# Patient Record
Sex: FEMALE | Race: WHITE | NOT HISPANIC OR LATINO | Employment: FULL TIME | URBAN - METROPOLITAN AREA
[De-identification: names, ages, dates, MRNs, and addresses within clinical notes are randomized per-mention and may not be internally consistent; named-entity substitution may affect disease eponyms.]

---

## 2017-03-21 ENCOUNTER — LAB VISIT (OUTPATIENT)
Dept: LAB | Facility: OTHER | Age: 27
End: 2017-03-21
Attending: OBSTETRICS & GYNECOLOGY
Payer: COMMERCIAL

## 2017-03-21 ENCOUNTER — OFFICE VISIT (OUTPATIENT)
Dept: OBSTETRICS AND GYNECOLOGY | Facility: CLINIC | Age: 27
End: 2017-03-21
Attending: OBSTETRICS & GYNECOLOGY
Payer: COMMERCIAL

## 2017-03-21 VITALS
WEIGHT: 197.75 LBS | DIASTOLIC BLOOD PRESSURE: 82 MMHG | SYSTOLIC BLOOD PRESSURE: 118 MMHG | HEIGHT: 65 IN | BODY MASS INDEX: 32.95 KG/M2

## 2017-03-21 DIAGNOSIS — N91.1 AMENORRHEA, SECONDARY: ICD-10-CM

## 2017-03-21 DIAGNOSIS — Z01.419 WELL WOMAN EXAM WITH ROUTINE GYNECOLOGICAL EXAM: Primary | ICD-10-CM

## 2017-03-21 DIAGNOSIS — E28.2 PCOS (POLYCYSTIC OVARIAN SYNDROME): ICD-10-CM

## 2017-03-21 DIAGNOSIS — B00.9 HSV-2 INFECTION: ICD-10-CM

## 2017-03-21 LAB
T4 FREE SERPL-MCNC: 0.78 NG/DL
TSH SERPL DL<=0.005 MIU/L-ACNC: 1.51 UIU/ML

## 2017-03-21 PROCEDURE — 84439 ASSAY OF FREE THYROXINE: CPT

## 2017-03-21 PROCEDURE — 83036 HEMOGLOBIN GLYCOSYLATED A1C: CPT

## 2017-03-21 PROCEDURE — 99999 PR PBB SHADOW E&M-EST. PATIENT-LVL II: CPT | Mod: PBBFAC,,, | Performed by: OBSTETRICS & GYNECOLOGY

## 2017-03-21 PROCEDURE — 36415 COLL VENOUS BLD VENIPUNCTURE: CPT

## 2017-03-21 PROCEDURE — 84443 ASSAY THYROID STIM HORMONE: CPT

## 2017-03-21 PROCEDURE — 99385 PREV VISIT NEW AGE 18-39: CPT | Mod: S$GLB,,, | Performed by: OBSTETRICS & GYNECOLOGY

## 2017-03-21 RX ORDER — VALACYCLOVIR HYDROCHLORIDE 500 MG/1
TABLET, FILM COATED ORAL
Refills: 5 | COMMUNITY
Start: 2017-03-11 | End: 2017-03-21 | Stop reason: SDUPTHER

## 2017-03-21 RX ORDER — VALACYCLOVIR HYDROCHLORIDE 500 MG/1
500 TABLET, FILM COATED ORAL DAILY
Qty: 90 TABLET | Refills: 5 | Status: SHIPPED | OUTPATIENT
Start: 2017-03-21 | End: 2022-02-24

## 2017-03-21 RX ORDER — MEDROXYPROGESTERONE ACETATE 10 MG/1
10 TABLET ORAL DAILY
Qty: 10 TABLET | Refills: 0 | Status: SHIPPED | OUTPATIENT
Start: 2017-03-21 | End: 2022-04-27

## 2017-03-21 RX ORDER — LEVONORGESTREL / ETHINYL ESTRADIOL AND ETHINYL ESTRADIOL 150-30(84)
1 KIT ORAL DAILY
Qty: 90 EACH | Refills: 4 | Status: SHIPPED | OUTPATIENT
Start: 2017-03-21 | End: 2022-04-27

## 2017-03-21 NOTE — PROGRESS NOTES
CC: Well woman exam    Hermila Busch is a 26 y.o. female  presents for well woman exam.  LMP: No LMP recorded. Patient is not currently having periods (Reason: Birth Control)..  Patient presents as a transfer of care due to insurance reasons.  Last pap was in 2016, which was normal, prior to that, she had an abnormal pap in , then q month paps after that which were all normal.  Patient has been diagnosed with PCOS in the past and would like to readdress this, states that she had been on OCPs (seasonique) until  and had normal cycles with this, states that she does not have normal cycles unless she is on birth control.  Since stopping OCPs, she has noted increased facial hair growth, also reports weight gain, states that she has had less than ideal diet and exercise habits.  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  Denies family history of breast/ovarian or colon ca in first degree or multiple relatives.  Works as a nurse in the ICU at [employer]    Past Medical History:   Diagnosis Date    Abnormal Pap smear     HPV    ADHD (attention deficit hyperactivity disorder)     Herpes simplex without mention of complication      Past Surgical History:   Procedure Laterality Date    cyst removal right eye        Social History     Social History    Marital status: Single     Spouse name: N/A    Number of children: N/A    Years of education: N/A     Occupational History    Not on file.     Social History Main Topics    Smoking status: Former Smoker     Packs/day: 0.50     Years: 4.00     Types: Cigarettes     Quit date: 2007    Smokeless tobacco: Never Used    Alcohol use Yes      Comment: social    Drug use: No    Sexual activity: Yes     Partners: Male     Birth control/ protection: OCP     Other Topics Concern    Not on file     Social History Narrative     Family  "History   Problem Relation Age of Onset    Hypertension Father     Amblyopia Father     Diabetes Other     ADD / ADHD Mother     No Known Problems Paternal Grandfather     No Known Problems Paternal Grandmother     No Known Problems Maternal Grandmother     No Known Problems Maternal Grandfather     Bipolar disorder Maternal Aunt     No Known Problems Sister     No Known Problems Brother     No Known Problems Maternal Uncle     No Known Problems Paternal Aunt     No Known Problems Paternal Uncle     Breast cancer Neg Hx     Colon cancer Neg Hx     Ovarian cancer Neg Hx     Suicide Neg Hx     Blindness Neg Hx     Cancer Neg Hx     Cataracts Neg Hx     Glaucoma Neg Hx     Macular degeneration Neg Hx     Retinal detachment Neg Hx     Strabismus Neg Hx     Stroke Neg Hx     Thyroid disease Neg Hx      OB History      Para Term  AB TAB SAB Ectopic Multiple Living    0                   /82  Ht 5' 5" (1.651 m)  Wt 89.7 kg (197 lb 12 oz)  BMI 32.91 kg/m2      ROS:  GENERAL: Denies weight gain or weight loss. Feeling well overall.   SKIN: Denies rash or lesions.   HEAD: Denies head injury or headache.   NODES: Denies enlarged lymph nodes.   CHEST: Denies chest pain or shortness of breath.   CARDIOVASCULAR: Denies palpitations or left sided chest pain.   ABDOMEN: No abdominal pain, constipation, diarrhea, nausea, vomiting or rectal bleeding.   URINARY: No frequency, dysuria, hematuria, or burning on urination.  REPRODUCTIVE: See HPI.   BREASTS: The patient performs breast self-examination and denies pain, lumps, or nipple discharge.   HEMATOLOGIC: No easy bruisability or excessive bleeding.   MUSCULOSKELETAL: Denies joint pain or swelling.   NEUROLOGIC: Denies syncope or weakness.   PSYCHIATRIC: Denies depression, anxiety or mood swings.    PHYSICAL EXAM:  APPEARANCE: Well nourished, well developed, in no acute distress.  AFFECT: WNL, alert and oriented x 3  SKIN: No acne.  " Hirsutism present on chin, no chest or abdominal hair noted.  NECK: Neck symmetric without masses or thyromegaly  NODES: No inguinal, cervical, axillary, or femoral lymph node enlargement  CHEST: Good respiratory effect  ABDOMEN: Soft.  No tenderness or masses.  No hepatosplenomegaly.  No hernias.  BREASTS: Symmetrical, no skin changes or visible lesions.  No palpable masses, nipple discharge bilaterally.  PELVIC: Normal external genitalia without lesions.  Normal hair distribution.  Adequate perineal body, normal urethral meatus.  Vagina moist and well rugated without lesions or discharge.  Cervix pink, without lesions, discharge or tenderness.  No significant cystocele or rectocele.  Bimanual exam shows uterus to be normal size, regular, mobile and nontender.  Adnexa without masses or tenderness.    EXTREMITIES: No edema.    Well woman exam with routine gynecological exam    PCOS (polycystic ovarian syndrome)  -     Hemoglobin A1c; Future; Expected date: 3/21/17  - Discussed diet exercise and weight loss, suggest attempt at losing 10% of current body weight  - Offered spironolactone, declines at this time.  - Meets rotterdam criteria for diagnosis, will r/o thyroid as contributor.    Amenorrhea, secondary  -     TSH; Future; Expected date: 3/21/17  -     T4, free; Future; Expected date: 3/21/17  -     L norgest/e.estradiol-e.estrad (AMETHIA) 0.15 mg-30 mcg (84)/10 mcg (7) 3MPk; Take 1 tablet by mouth once daily.  Dispense: 90 each; Refill: 4  -     medroxyPROGESTERone (PROVERA) 10 MG tablet; Take 1 tablet (10 mg total) by mouth once daily.  Dispense: 10 tablet; Refill: 0    HSV-2 infection  -     valacyclovir (VALTREX) 500 MG tablet; Take 1 tablet (500 mg total) by mouth once daily.  Dispense: 90 tablet; Refill: 5    History abnormal pap        - Repeat in 6/2017, then return to normal screening if results normal at that time        Patient was counseled today on A.C.S. Pap guidelines and recommendations for  yearly pelvic exams, mammograms and monthly self breast exams; to see her PCP for other health maintenance.     No Follow-up on file.

## 2017-03-21 NOTE — MR AVS SNAPSHOT
"    [facility] [address]  [address]  [place] [address]  Phone: [phone]  Fax: [phone]                  Hermila Busch   3/21/2017 11:00 AM   Office Visit    Description:  Female : 1990   Provider:  Sarah Vernon DO   Department:  [facility] [address]           Reason for Visit     Amenorrhea                To Do List           Goals (5 Years of Data)     None      [facility]     [facility] On [facility] Nurse [facility] Line -  Assistance  Registered nurses in the [facility] On Call Center provide clinical advisement, health education, appointment booking, and other advisory services.  Call for this free service at [phone].             Medications           Message regarding Medications     Verify the changes and/or additions to your medication regime listed below are the same as discussed with your clinician today.  If any of these changes or additions are incorrect, please notify your healthcare provider.             Verify that the below list of medications is an accurate representation of the medications you are currently taking.  If none reported, the list may be blank. If incorrect, please contact your healthcare provider. Carry this list with you in case of emergency.           Current Medications     AMETHIA 0.15 mg-30 mcg (84)/10 mcg (7) 3MPk     valacyclovir (VALTREX) 500 MG tablet TAKE 1 TABLET BY MOUTH ONCE A DAY AS DIRECTED, THEN INCREASE TO TWICE A DAY FOR 3 DAYS IF BREAK OUT    dextroamphetamine-amphetamine (AMPHETAMINE SALT COMBO) 20 mg tablet Take 1 tablet by mouth 2 (two) times daily. Not to be taken later than 3pm.           Clinical Reference Information           Your Vitals Were     BP Height Weight BMI       118/82 5' 5" (1.651 m) 89.7 kg (197 lb 12 oz) 32.91 kg/m2       Blood Pressure          Most Recent Value    BP  118/82      Allergies as of 3/21/2017     Amoxil [Amoxicillin]    Sulfacetamide Sodium    Doxycycline    Sulfa (Sulfonamide " Antibiotics)      Immunizations Administered on Date of Encounter - 3/21/2017     None      Language Assistance Services     ATTENTION: Language assistance services are available, free of charge. Please call [phone].      ATENCIÓN: Si habhans callahan, tiene a saul disposición servicios gratuitos de asistencia lingüística. Llame al [phone].     CHÚ Ý: N?u b?n nói Ti?ng Vi?t, có các d?ch v? h? tr? ngôn ng? mi?n phí dành cho b?n. G?i s? [phone].         [facility] [address] complies with applicable Federal civil rights laws and does not discriminate on the basis of race, color, national origin, age, disability, or sex.

## 2017-03-22 ENCOUNTER — TELEPHONE (OUTPATIENT)
Dept: OBSTETRICS AND GYNECOLOGY | Facility: CLINIC | Age: 27
End: 2017-03-22

## 2017-03-22 LAB
ESTIMATED AVG GLUCOSE: 97 MG/DL
HBA1C MFR BLD HPLC: 5 %

## 2021-09-01 NOTE — TELEPHONE ENCOUNTER
Called patient to inform of normal lab findings.   What Type Of Note Output Would You Prefer (Optional)?: Standard Output Hpi Title: Evaluation of Skin Lesions How Severe Are Your Spot(S)?: mild Have Your Spot(S) Been Treated In The Past?: has not been treated

## 2025-06-18 ENCOUNTER — HOSPITAL ENCOUNTER (EMERGENCY)
Facility: HOSPITAL | Age: 35
Discharge: HOME | End: 2025-06-18
Attending: EMERGENCY MEDICINE
Payer: COMMERCIAL

## 2025-06-18 VITALS
OXYGEN SATURATION: 99 % | TEMPERATURE: 98 F | HEART RATE: 61 BPM | SYSTOLIC BLOOD PRESSURE: 108 MMHG | DIASTOLIC BLOOD PRESSURE: 78 MMHG | HEIGHT: 65 IN | RESPIRATION RATE: 12 BRPM | BODY MASS INDEX: 29.68 KG/M2 | WEIGHT: 178.13 LBS

## 2025-06-18 DIAGNOSIS — R07.9 CHEST PAIN: ICD-10-CM

## 2025-06-18 LAB
ABSOLUTE EOSINOPHIL (OHS): 0.16 K/UL
ABSOLUTE MONOCYTE (OHS): 0.67 K/UL (ref 0.3–1)
ABSOLUTE NEUTROPHIL COUNT (OHS): 5.9 K/UL (ref 1.8–7.7)
ALBUMIN SERPL BCP-MCNC: 4.2 G/DL (ref 3.5–5.2)
ALP SERPL-CCNC: 38 UNIT/L (ref 40–150)
ALT SERPL W/O P-5'-P-CCNC: 17 UNIT/L (ref 10–44)
ANION GAP (OHS): 13 MMOL/L (ref 8–16)
AST SERPL-CCNC: 14 UNIT/L (ref 11–45)
B-HCG UR QL: NEGATIVE
BASOPHILS # BLD AUTO: 0.01 K/UL
BASOPHILS NFR BLD AUTO: 0.1 %
BILIRUB SERPL-MCNC: 0.5 MG/DL (ref 0.1–1)
BUN SERPL-MCNC: 12 MG/DL (ref 6–20)
CALCIUM SERPL-MCNC: 9 MG/DL (ref 8.7–10.5)
CHLORIDE SERPL-SCNC: 103 MMOL/L (ref 95–110)
CO2 SERPL-SCNC: 24 MMOL/L (ref 23–29)
CREAT SERPL-MCNC: 0.7 MG/DL (ref 0.5–1.4)
ERYTHROCYTE [DISTWIDTH] IN BLOOD BY AUTOMATED COUNT: 12.3 % (ref 11.5–14.5)
GFR SERPLBLD CREATININE-BSD FMLA CKD-EPI: >60 ML/MIN/1.73/M2
GLUCOSE SERPL-MCNC: 100 MG/DL (ref 70–110)
HCT VFR BLD AUTO: 43 % (ref 37–48.5)
HCV AB SERPL QL IA: NEGATIVE
HGB BLD-MCNC: 14.5 GM/DL (ref 12–16)
HIV 1+2 AB+HIV1 P24 AG SERPL QL IA: NEGATIVE
HOLD SPECIMEN: NORMAL
IMM GRANULOCYTES # BLD AUTO: 0.02 K/UL (ref 0–0.04)
IMM GRANULOCYTES NFR BLD AUTO: 0.2 % (ref 0–0.5)
LYMPHOCYTES # BLD AUTO: 1.94 K/UL (ref 1–4.8)
MCH RBC QN AUTO: 30.2 PG (ref 27–31)
MCHC RBC AUTO-ENTMCNC: 33.7 G/DL (ref 32–36)
MCV RBC AUTO: 90 FL (ref 82–98)
NUCLEATED RBC (/100WBC) (OHS): 0 /100 WBC
OHS QRS DURATION: 82 MS
OHS QTC CALCULATION: 431 MS
PLATELET # BLD AUTO: 204 K/UL (ref 150–450)
PMV BLD AUTO: 10.4 FL (ref 9.2–12.9)
POTASSIUM SERPL-SCNC: 3.6 MMOL/L (ref 3.5–5.1)
PROT SERPL-MCNC: 7.4 GM/DL (ref 6–8.4)
RBC # BLD AUTO: 4.8 M/UL (ref 4–5.4)
RELATIVE EOSINOPHIL (OHS): 1.8 %
RELATIVE LYMPHOCYTE (OHS): 22.3 % (ref 18–48)
RELATIVE MONOCYTE (OHS): 7.7 % (ref 4–15)
RELATIVE NEUTROPHIL (OHS): 67.9 % (ref 38–73)
SODIUM SERPL-SCNC: 140 MMOL/L (ref 136–145)
TROPONIN I SERPL DL<=0.01 NG/ML-MCNC: <0.006 NG/ML
TROPONIN I SERPL DL<=0.01 NG/ML-MCNC: <0.006 NG/ML
WBC # BLD AUTO: 8.7 K/UL (ref 3.9–12.7)

## 2025-06-18 PROCEDURE — 85025 COMPLETE CBC W/AUTO DIFF WBC: CPT | Mod: ER | Performed by: EMERGENCY MEDICINE

## 2025-06-18 PROCEDURE — 93005 ELECTROCARDIOGRAM TRACING: CPT | Mod: ER

## 2025-06-18 PROCEDURE — 86803 HEPATITIS C AB TEST: CPT | Performed by: EMERGENCY MEDICINE

## 2025-06-18 PROCEDURE — 84484 ASSAY OF TROPONIN QUANT: CPT | Mod: ER | Performed by: EMERGENCY MEDICINE

## 2025-06-18 PROCEDURE — 87389 HIV-1 AG W/HIV-1&-2 AB AG IA: CPT | Performed by: EMERGENCY MEDICINE

## 2025-06-18 PROCEDURE — 25000003 PHARM REV CODE 250: Mod: ER | Performed by: EMERGENCY MEDICINE

## 2025-06-18 PROCEDURE — 99285 EMERGENCY DEPT VISIT HI MDM: CPT | Mod: 25,ER

## 2025-06-18 PROCEDURE — 84460 ALANINE AMINO (ALT) (SGPT): CPT | Mod: ER | Performed by: EMERGENCY MEDICINE

## 2025-06-18 PROCEDURE — 81025 URINE PREGNANCY TEST: CPT | Mod: ER | Performed by: EMERGENCY MEDICINE

## 2025-06-18 PROCEDURE — 93010 ELECTROCARDIOGRAM REPORT: CPT | Mod: ,,, | Performed by: INTERNAL MEDICINE

## 2025-06-18 RX ORDER — LIDOCAINE HYDROCHLORIDE 20 MG/ML
15 SOLUTION OROPHARYNGEAL ONCE
Status: COMPLETED | OUTPATIENT
Start: 2025-06-18 | End: 2025-06-18

## 2025-06-18 RX ORDER — SPIRONOLACTONE 25 MG/1
25 TABLET ORAL DAILY
COMMUNITY

## 2025-06-18 RX ORDER — ALUMINUM HYDROXIDE, MAGNESIUM HYDROXIDE, AND SIMETHICONE 1200; 120; 1200 MG/30ML; MG/30ML; MG/30ML
30 SUSPENSION ORAL ONCE
Status: COMPLETED | OUTPATIENT
Start: 2025-06-18 | End: 2025-06-18

## 2025-06-18 RX ADMIN — ALUMINUM HYDROXIDE, MAGNESIUM HYDROXIDE, AND DIMETHICONE 30 ML: 200; 20; 200 SUSPENSION ORAL at 05:06

## 2025-06-18 RX ADMIN — LIDOCAINE HYDROCHLORIDE 15 ML: 20 SOLUTION ORAL at 05:06

## 2025-06-18 NOTE — ED PROVIDER NOTES
ED Provider Note - 6/18/2025    History     Chief Complaint   Patient presents with    Chest Pain     Anterior left sided CP radiating to back. Started yesterday at 3pm. +nausea.      Patient currently presents with chief complaint of chest pain.  This began yesterday around 3PM but resolved only to return not long before arrival.  This is localized to the substernal region of the chest.  Patient reports  nausea associated with this process and denies associated SOB, diaphoresis, palpitations, and dizziness.  Patient's pain does not radiate.  Patient denies aspirin use in the last 24 hours. Denies DM, CAD, HLD, HTN, obesity, and tobacco abuse.    Patient denies leg swelling, history of VTE, immobilization, malignancy, prolonged travel, recent surgery, and recent trauma.      Review of patient's allergies indicates:   Allergen Reactions    Doxycycline Rash and Hives    Sulfa (sulfonamide antibiotics) Rash, Other (See Comments) and Hives     Happened as an infant, Unsure  Other reaction(s): Other (See Comments)  Happened as an infant, Unsure      Sulfasalazine Hives     Past Medical History:   Diagnosis Date    Abnormal Pap smear 01/01/2007    HPV    ADD (attention deficit disorder)     Anxiety     COVID-19     Genital herpes     HPV exposure     PCOS (polycystic ovarian syndrome)      Past Surgical History:   Procedure Laterality Date    cyst removal right eye   1999     Family History   Problem Relation Name Age of Onset    Coronary artery disease Neg Hx      Heart disease Neg Hx      Diabetes type II Neg Hx      Hypertension Father      Amblyopia Father      Diabetes Other MatGGM     ADD / ADHD Mother      No Known Problems Paternal Grandfather      No Known Problems Paternal Grandmother      No Known Problems Maternal Grandmother      No Known Problems Maternal Grandfather      Bipolar disorder Maternal Aunt      No Known Problems Sister      No Known Problems Brother      No Known Problems Maternal Uncle      No  "Known Problems Paternal Aunt      No Known Problems Paternal Uncle      Breast cancer Neg Hx      Colon cancer Neg Hx      Ovarian cancer Neg Hx      Suicide Neg Hx      Blindness Neg Hx      Cancer Neg Hx      Cataracts Neg Hx      Glaucoma Neg Hx      Macular degeneration Neg Hx      Retinal detachment Neg Hx      Strabismus Neg Hx      Stroke Neg Hx      Thyroid disease Neg Hx       Social History[1]     Review of Systems   Constitutional:  Negative for chills and fever.   HENT:  Negative for congestion.    Respiratory:  Negative for cough and shortness of breath.    Cardiovascular:  Positive for chest pain. Negative for palpitations.   Gastrointestinal:  Positive for nausea. Negative for abdominal pain, diarrhea and vomiting.   Neurological:  Negative for dizziness and light-headedness.     The patient's list of active medical problems, social history, medications, and allergies as documented per RN staff has been reviewed.     Physical Exam     Vitals:    06/18/25 0149 06/18/25 0210   BP: 115/70    Pulse: 76 61   Resp: 20    Temp: 98 °F (36.7 °C)    TempSrc: Oral    SpO2: 100%    Weight: 80.8 kg (178 lb 2.1 oz)    Height: 5' 5" (1.651 m)      Physical Exam    Nursing note and vitals reviewed.  Constitutional: She appears well-developed and well-nourished. She is not diaphoretic. No distress.   HENT:   Head: Normocephalic and atraumatic.   Nose: Nose normal. Mouth/Throat: Oropharynx is clear and moist.   Eyes: Conjunctivae are normal. No scleral icterus.   Neck: Neck supple. No JVD present.   Cardiovascular:  Normal rate, regular rhythm, normal heart sounds and intact distal pulses.           Pulmonary/Chest: Breath sounds normal. No respiratory distress.   Musculoskeletal:         General: No edema. Normal range of motion.      Cervical back: Neck supple.     Neurological: She is alert and oriented to person, place, and time.   Skin: Skin is warm and dry.       ED Procedures   Procedures    MDM  Differential " Diagnoses   Based on available history, the working differential diagnoses considered during this evaluation include but are not limited to acute coronary syndrome, pulmonary embolus, esophageal perforation, aortic dissection, pneumonia, and pneumothorax as well as musculoskeletal sources including chest wall strain and costochondritis and GI sources such as esophageal spasm and GERD..      LABS     Labs Reviewed   COMPREHENSIVE METABOLIC PANEL - Abnormal       Result Value    Sodium 140      Potassium 3.6      Chloride 103      CO2 24      Glucose 100      BUN 12      Creatinine 0.7      Calcium 9.0      Protein Total 7.4      Albumin 4.2      Bilirubin Total 0.5      ALP 38 (*)     AST 14      ALT 17      Anion Gap 13      eGFR >60     PREGNANCY TEST, URINE RAPID - Normal    hCG Qualitative, Urine Negative     TROPONIN I - Normal    Troponin-I <0.006     CBC WITH DIFFERENTIAL - Normal    WBC 8.70      RBC 4.80      HGB 14.5      HCT 43.0      MCV 90      MCH 30.2      MCHC 33.7      RDW 12.3      Platelet Count 204      MPV 10.4      Nucleated RBC 0      Neut % 67.9      Lymph % 22.3      Mono % 7.7      Eos % 1.8      Basophil % 0.1      Imm Grans % 0.2      Neut # 5.90      Lymph # 1.94      Mono # 0.67      Eos # 0.16      Baso # 0.01      Imm Grans # 0.02     CBC W/ AUTO DIFFERENTIAL    Narrative:     The following orders were created for panel order CBC Auto Differential.  Procedure                               Abnormality         Status                     ---------                               -----------         ------                     CBC with Differential[[id number]]       Normal              Final result                 Please view results for these tests on the individual orders.   HEPATITIS C ANTIBODY   HEP C VIRUS HOLD SPECIMEN   HIV 1 / 2 ANTIBODY   TROPONIN I           Notable findings from my independent interpretations of the labs (if ordered) include:  CMP and CBC unremarkable.  Troponin  level unremarkable.     Imaging     Imaging Results              X-Ray Chest AP Portable (In process)  Result time 06/18/25 02:32:44                          Independent interpretation of the chest x-ray shows no evidence of infiltrate, effusion, cardiomegaly, or other acute findings.         EKG     EKG Readings: (Independently Interpreted)   Initial Reading: No STEMI. Rhythm: Normal Sinus Rhythm. Heart Rate: 63. Ectopy: No Ectopy. Conduction: Normal. Axis: Normal.       ED Management/Discussion   Medications - No data to display         During the course of the patient's evaluation, CTA CHEST was considered but deferred secondary to negative PERC score.                     Given the patient's low risk for MACE based on a carefully determined HEART score (<4) and overall clinical judgement; unremarkable cardiac monitoring and troponin levels drawn at the time of arrival and again >3 hours later have been used to effectively rule out ACS.  Additionally, I have no considerable suspicion for aortic dissection/aneurysm, esophageal perforation, pneumothorax or acute pulmonary complications based on the presentation, exam, lab results, and imaging.                                On final assessment, the patient appears suitable for discharge.  I see no indication of an emergent process beyond that addressed during our encounter but have duly counseled the patient/family regarding the need for prompt follow-up as well as the indications that should prompt immediate return to the emergency room.  The patient/family has been provided with language -specific verbal and printed direction regarding our final diagnosis(es) as well as instructions regarding use of OTC and/or Rx medications intended to manage the patient's aforementioned conditions including:  ED Prescriptions    None           Patient has been advised of the following recommended follow-up instructions:  Follow-up Information       Follow up With Specialties  Details Why Contact Info    PCP  Schedule an appointment as soon as possible for a visit  for reassessment     [facility] Emergency Medicine Go to  As needed, If symptoms worsen [id number]  Emergency Department  [facility] [id number] [phone]          The patient/family communicates understanding of all this information and all remaining questions and concerns were addressed at this time.      Referrals:  No orders of the defined types were placed in this encounter.            CLINICAL IMPRESSION    ICD-10-CM ICD-9-CM   1. Chest pain  R07.9 786.50           Social Drivers of Health     Tobacco Use: Medium Risk (6/18/2025)    Patient History     Smoking Tobacco Use: Former     Smokeless Tobacco Use: Never     Passive Exposure: Not on file   Alcohol Use: Not At Risk (2/7/2024)    [place]    AUDIT-C     Frequency of Alcohol Consumption: 2-4 times a month     Average Number of Drinks: 1 or 2     Frequency of Binge Drinking: Never   Financial Resource Strain: Low Risk  (2/7/2024)    [place]    Overall Financial Resource Strain (CARDIA)     Difficulty of Paying Living Expenses: Not hard at all   Food Insecurity: No Food Insecurity (2/7/2024)    [place]    Hunger Vital Sign     Worried About Running Out of Food in the Last Year: Never true     Ran Out of Food in the Last Year: Never true   Transportation Needs: No Transportation Needs (2/7/2024)    [place]    PRAPARE - Transportation     Lack of Transportation (Medical): No     Lack of Transportation (Non-Medical): No   Physical Activity: Insufficiently Active (2/7/2024)    [place]    Exercise Vital Sign     Days of Exercise per Week: 2 days     Minutes of Exercise per Session: 30 min   Stress: Stress Concern Present (2/7/2024)    [place]    [employer] - Occupational Stress  Questionnaire     Feeling of Stress : To some extent   Housing Stability: Low Risk  (2/7/2024)    Received from [facility]    Housing Stability Vital Sign     Unable to Pay for Housing in the Last Year: No     Number of Times Moved in the Last Year: 0     Homeless in the Last Year: No   Depression: Not at risk (10/19/2020)    [facility]    PHQ-2     PHQ-2 Score: 0   Utilities: Not At Risk (2/7/2024)    Received from [facility] Utilities     Threatened with loss of utilities: No   Health Literacy: Adequate Health Literacy (2/7/2024)    Received from [facility]     Health Literacy     Frequency of need for help with medical instructions: Never   Social Isolation: Not on file             [1]   Social History  Tobacco Use    Smoking status: Former    Smokeless tobacco: Never   Substance Use Topics    Alcohol use: Not Currently     Comment: social    Drug use: Never        Ap Garcia MD  06/18/25 2058

## 2025-06-18 NOTE — Clinical Note
"Hermila Krause" Eladio was seen and treated in our emergency department on 6/18/2025.  She may return to work on 06/20/2025.       If you have any questions or concerns, please don't hesitate to call.       RN    "